# Patient Record
Sex: MALE | Race: WHITE
[De-identification: names, ages, dates, MRNs, and addresses within clinical notes are randomized per-mention and may not be internally consistent; named-entity substitution may affect disease eponyms.]

---

## 2018-07-22 ENCOUNTER — HOSPITAL ENCOUNTER (OUTPATIENT)
Dept: HOSPITAL 13 - RAD | Age: 58
Discharge: HOME | End: 2018-07-22
Payer: COMMERCIAL

## 2018-07-22 DIAGNOSIS — K57.90: Primary | ICD-10-CM

## 2018-07-22 DIAGNOSIS — N28.1: ICD-10-CM

## 2018-07-22 DIAGNOSIS — K76.0: ICD-10-CM

## 2023-01-23 ENCOUNTER — HOSPITAL ENCOUNTER (OUTPATIENT)
Dept: NON INVASIVE DIAGNOSTICS | Age: 63
Discharge: HOME | End: 2023-01-23
Payer: COMMERCIAL

## 2023-01-23 ENCOUNTER — TRANSCRIBE ORDER (OUTPATIENT)
Dept: REGISTRATION | Age: 63
End: 2023-01-23

## 2023-01-23 ENCOUNTER — HOSPITAL ENCOUNTER (OUTPATIENT)
Dept: LAB | Age: 63
Discharge: HOME | End: 2023-01-23

## 2023-01-23 DIAGNOSIS — D36.16 BENIGN NEOPLASM OF PERIPHERAL NERVE OF PELVIS REGION: ICD-10-CM

## 2023-01-23 DIAGNOSIS — K40.30 INGUINAL HERNIA WITH OBSTRUCTION BUT NO GANGRENE: ICD-10-CM

## 2023-01-23 DIAGNOSIS — K40.30 INGUINAL HERNIA WITH OBSTRUCTION BUT NO GANGRENE: Primary | ICD-10-CM

## 2023-01-23 LAB
ATRIAL RATE: 62 BPM
CALCULATED P AXIS, ECG09: 74 DEGREES
CALCULATED R AXIS, ECG10: 55 DEGREES
CALCULATED T AXIS, ECG11: 49 DEGREES
DIAGNOSIS, 93000: NORMAL
P-R INTERVAL, ECG05: 166 MS
Q-T INTERVAL, ECG07: 398 MS
QRS DURATION, ECG06: 86 MS
QTC CALCULATION (BEZET), ECG08: 403 MS
SENTARA SPECIMEN COL,SENBCF: NORMAL
VENTRICULAR RATE, ECG03: 62 BPM

## 2023-01-23 PROCEDURE — 93005 ELECTROCARDIOGRAM TRACING: CPT

## 2023-01-23 PROCEDURE — 99001 SPECIMEN HANDLING PT-LAB: CPT

## 2023-02-10 ENCOUNTER — HOSPITAL ENCOUNTER (OUTPATIENT)
Age: 63
Setting detail: OUTPATIENT SURGERY
Discharge: HOME | End: 2023-02-10
Attending: SURGERY | Admitting: SURGERY
Payer: COMMERCIAL

## 2023-02-10 ENCOUNTER — ANESTHESIA EVENT (OUTPATIENT)
Dept: ENDOSCOPY | Age: 63
End: 2023-02-10
Payer: COMMERCIAL

## 2023-02-10 ENCOUNTER — ANESTHESIA (OUTPATIENT)
Dept: ENDOSCOPY | Age: 63
End: 2023-02-10
Payer: COMMERCIAL

## 2023-02-10 VITALS
BODY MASS INDEX: 24.65 KG/M2 | TEMPERATURE: 97.6 F | RESPIRATION RATE: 16 BRPM | SYSTOLIC BLOOD PRESSURE: 110 MMHG | HEIGHT: 70 IN | HEART RATE: 67 BPM | OXYGEN SATURATION: 100 % | DIASTOLIC BLOOD PRESSURE: 79 MMHG | WEIGHT: 172.2 LBS

## 2023-02-10 PROCEDURE — 74011250636 HC RX REV CODE- 250/636: Performed by: SURGERY

## 2023-02-10 PROCEDURE — 77030040361 HC SLV COMPR DVT MDII -B: Performed by: SURGERY

## 2023-02-10 PROCEDURE — 76040000019: Performed by: SURGERY

## 2023-02-10 PROCEDURE — 76060000031 HC ANESTHESIA FIRST 0.5 HR: Performed by: SURGERY

## 2023-02-10 PROCEDURE — 2709999900 HC NON-CHARGEABLE SUPPLY: Performed by: SURGERY

## 2023-02-10 PROCEDURE — 74011000250 HC RX REV CODE- 250: Performed by: INTERNAL MEDICINE

## 2023-02-10 PROCEDURE — 74011250636 HC RX REV CODE- 250/636: Performed by: INTERNAL MEDICINE

## 2023-02-10 RX ORDER — SELENIUM 100 MCG
1 TABLET ORAL DAILY
COMMUNITY

## 2023-02-10 RX ORDER — SODIUM CHLORIDE 9 MG/ML
125 INJECTION, SOLUTION INTRAVENOUS CONTINUOUS
Status: DISCONTINUED | OUTPATIENT
Start: 2023-02-10 | End: 2023-02-10 | Stop reason: HOSPADM

## 2023-02-10 RX ORDER — LIDOCAINE HYDROCHLORIDE 20 MG/ML
INJECTION, SOLUTION EPIDURAL; INFILTRATION; INTRACAUDAL; PERINEURAL AS NEEDED
Status: DISCONTINUED | OUTPATIENT
Start: 2023-02-10 | End: 2023-02-10 | Stop reason: HOSPADM

## 2023-02-10 RX ORDER — PROPOFOL 10 MG/ML
INJECTION, EMULSION INTRAVENOUS AS NEEDED
Status: DISCONTINUED | OUTPATIENT
Start: 2023-02-10 | End: 2023-02-10 | Stop reason: HOSPADM

## 2023-02-10 RX ORDER — PANTOPRAZOLE SODIUM 20 MG/1
20 TABLET, DELAYED RELEASE ORAL DAILY
COMMUNITY
Start: 2023-02-01

## 2023-02-10 RX ORDER — CHOLECALCIFEROL (VITAMIN D3) 50 MCG
1 CAPSULE ORAL DAILY
COMMUNITY

## 2023-02-10 RX ORDER — CALCIUM POLYCARBOPHIL 625 MG
625 TABLET ORAL DAILY
COMMUNITY

## 2023-02-10 RX ORDER — MULTIVIT WITH MINERALS/HERBS
1 TABLET ORAL DAILY
COMMUNITY

## 2023-02-10 RX ORDER — MULTIVITAMIN WITH IRON
TABLET ORAL
COMMUNITY

## 2023-02-10 RX ORDER — GLUCOSAMINE SULFATE 1500 MG
1000 POWDER IN PACKET (EA) ORAL DAILY
COMMUNITY

## 2023-02-10 RX ADMIN — LIDOCAINE HYDROCHLORIDE 80 MG: 20 INJECTION, SOLUTION EPIDURAL; INFILTRATION; INTRACAUDAL; PERINEURAL at 08:55

## 2023-02-10 RX ADMIN — SODIUM CHLORIDE 125 ML/HR: 9 INJECTION, SOLUTION INTRAVENOUS at 08:07

## 2023-02-10 RX ADMIN — PROPOFOL 50 MG: 10 INJECTION, EMULSION INTRAVENOUS at 08:55

## 2023-02-10 RX ADMIN — PROPOFOL 75 MCG/KG/MIN: 10 INJECTION, EMULSION INTRAVENOUS at 08:56

## 2023-02-10 NOTE — OP NOTES
Colonoscopy Procedure Note    Indications: Routine screening    Anesthesia/Sedation: MAC anesthesia Propofol    Pre-Procedure Exam:  Airway: clear   Heart: normal S1and S2    Lungs: clear bilateral  Abdomen: soft, nontender, bowel sounds present and normal in all quadrants   Mental Status: awake, alert, and oriented to person, place, and time      Procedure in Detail:  Informed consent was obtained for the procedure, including sedation. Risks of perforation, hemorrhage, adverse drug reaction, and aspiration were discussed. The patient was placed in the left lateral decubitus position. Based on the pre-procedure assessment, including review of the patient's medical history, medications, allergies, and review of systems, he had been deemed to be an appropriate candidate for moderate sedation; he was therefore sedated with the medications listed above. The patient was monitored continuously with ECG tracing, pulse oximetry, blood pressure monitoring, and direct observations. A rectal examination was performed. The [id number] was inserted into the rectum and advanced under direct vision to the cecum, which was identified by the ileocecal valve and appendiceal orifice. The quality of the colonic preparation was satisfactory. A careful inspection was made as the colonoscope was withdrawn, including a retroflexed view of the rectum; findings and interventions are described below. Appropriate photodocumentation was obtained. Findings:   Rectum:   Normal  Sigmoid:     - Excavated lesions:     - Diverticulosis  Descending Colon:   Normal  Transverse Colon:   Normal  Ascending Colon:   Normal  Cecum:   Normal  Terminal Ileum:   Not entered    Specimens: No specimens were collected. EBL: None    Complications: None; patient tolerated the procedure well. Attending Attestation: I performed the procedure.     Assistant:  None    Implants:  * No implants in log *    Recommendations:   - For colon cancer screening in this average-risk patient, colonoscopy may be repeated in 10 years.     Signed By: Bharathi Gaona MD                       2/10/2023

## 2023-02-10 NOTE — DISCHARGE INSTRUCTIONS
Santy Port  [id number]  1960    COLON DISCHARGE INSTRUCTIONS    Discomfort:  Redness at IV site- apply warm compress to area; if redness or soreness persist- contact your physician  There may be a slight amount of blood passed from the rectum  Gaseous discomfort- walking, belching will help relieve any discomfort  You should not operate a vehicle for 12 hours  You should not engage in an occupation involving machinery or appliances for rest of today  You may not drink alcoholic beverages for at least 12 hours  Avoid making any critical decisions for at least 24 hour  DIET:   High fiber diet. - however -  remember your colon is empty and a heavy meal will produce gas. Avoid these foods:  vegetables, fried / greasy foods, carbonated drinks for today     ACTIVITY:  You may resume your normal daily activities it is recommended that you spend the remainder of the day resting -  avoid any strenuous activity. CALL M.D. ANY SIGN OF:   Increasing pain, nausea, vomiting  Abdominal distension (swelling)  New increased bleeding (oral or rectal)  Fever (chills)  Pain in chest area  Bloody discharge from nose or mouth  Shortness of breath      Follow-up Instructions:   Dr Ford Ashby will call you in one to two weeks. If you do not hear from him, please call his office [phone].                           Santy Port  [id number]  1960        DISCHARGE SUMMARY from Nurse    The following personal items collected during your admission are returned to you:   Dental Appliance: Dental Appliances: None  Vision: Visual Aid: Glasses, At bedside, Other (comment) (reading glasses with wife)  Hearing Aid:    Jewelry:    Clothing:    Other Valuables:    Valuables sent to safe:      DISCHARGE SUMMARY from Nurse    PATIENT INSTRUCTIONS:    After general anesthesia or intravenous sedation, for 24 hours or while taking prescription Narcotics:  Limit your activities  Do not drive and operate hazardous machinery  Do not make important personal or business decisions  Do  not drink alcoholic beverages  If you have not urinated within 8 hours after discharge, please contact your surgeon on call. Report the following to your surgeon:  Excessive pain, swelling, redness or odor of or around the surgical area  Temperature over 100.5  Nausea and vomiting lasting longer than 4 hours or if unable to take medications  Any signs of decreased circulation or nerve impairment to extremity: change in color, persistent  numbness, tingling, coldness or increase pain  Any questions    What to do at Home:  Recommended activity: Activity as tolerated and no driving for today. If you experience any of the following symptoms as above, please follow up with Dr. Jayashree Lam. *  Please give a list of your current medications to your Primary Care Provider. *  Please update this list whenever your medications are discontinued, doses are      changed, or new medications (including over-the-counter products) are added. *  Please carry medication information at all times in case of emergency situations. These are general instructions for a healthy lifestyle:    No smoking/ No tobacco products/ Avoid exposure to second hand smoke  Surgeon General's Warning:  Quitting smoking now greatly reduces serious risk to your health. Obesity, smoking, and sedentary lifestyle greatly increases your risk for illness    A healthy diet, regular physical exercise & weight monitoring are important for maintaining a healthy lifestyle    You may be retaining fluid if you have a history of heart failure or if you experience any of the following symptoms:  Weight gain of 3 pounds or more overnight or 5 pounds in a week, increased swelling in our hands or feet or shortness of breath while lying flat in bed. Please call your doctor as soon as you notice any of these symptoms; do not wait until your next office visit.     The discharge information has been reviewed with the patient and spouse. The patient and spouse verbalized understanding. Discharge medications reviewed with the patient and spouse and appropriate educational materials and side effects teaching were provided. ___________________________________________________________________________________________________________________________________  Patient armband removed and shredded.

## 2023-02-10 NOTE — BRIEF OP NOTE
Brief Postoperative Note    Patient: Vu Ospina  YOB: 1960  MRN: [id number]    Date of Procedure: 2/10/2023     Pre-Op Diagnosis: SCREENING    Post-Op Diagnosis: Same as preoperative diagnosis. Procedure(s):  COLONOSCOPY WITH MAC    Surgeon(s):   Chiquis Sharma MD    Surgical Assistant: None    Anesthesia: MAC     Estimated Blood Loss (mL): Minimal    Complications: None    Specimens: * No specimens in log *     Implants: * No implants in log *    Drains: * No LDAs found *    Findings: tics    Electronically Signed by Chris Javed MD on 2/10/2023 at 9:10 AM

## 2023-02-10 NOTE — ANESTHESIA POSTPROCEDURE EVALUATION
Procedure(s):  COLONOSCOPY WITH MAC.     MAC    Anesthesia Post Evaluation      Multimodal analgesia: multimodal analgesia not used between 6 hours prior to anesthesia start to PACU discharge  Patient location during evaluation: PACU  Patient participation: complete - patient participated  Level of consciousness: awake and alert  Pain score: 0  Pain management: adequate  Airway patency: patent  Anesthetic complications: no  Cardiovascular status: blood pressure returned to baseline and hemodynamically stable  Respiratory status: room air, unassisted, spontaneous ventilation and nonlabored ventilation  Hydration status: balanced  Post anesthesia nausea and vomiting:  none  Final Post Anesthesia Temperature Assessment:  Normothermia (36.0-37.5 degrees C)      INITIAL Post-op Vital signs:   Vitals Value Taken Time   /69 02/10/23 0915   Temp 36.4 °C (97.6 °F) 02/10/23 0915   Pulse 65 02/10/23 0915   Resp 16 02/10/23 0915   SpO2 96 % 02/10/23 0915

## 2023-02-10 NOTE — H&P
History & Physical    Patient: Guadalupe Vela MRN: [id number]  CSN: [id number]    YOB: 1960  Age: 60 y.o. Sex: male      DOA: 2/10/2023       HPI:     Guadalupe Vela is a 60 y.o. male who presents for cscope    Past Medical History:   Diagnosis Date    GERD (gastroesophageal reflux disease)     Psychiatric disorder     anxiety       Past Surgical History:   Procedure Laterality Date    HX HEENT      tonsillectomy    HX HEENT      septoplasty    HX HERNIA REPAIR Right     inguinal    HX ORTHOPAEDIC      left elbow - ulner nerve relocation       History reviewed. No pertinent family history. Social History     Socioeconomic History    Marital status:    Tobacco Use    Smoking status: Never     Passive exposure: Never    Smokeless tobacco: Never   Substance and Sexual Activity    Alcohol use: Yes     Comment: rarely    Drug use: No       Prior to Admission medications    Medication Sig Start Date End Date Taking? Authorizing Provider   pantoprazole (PROTONIX) 20 mg tablet Take 20 mg by mouth daily. 2/1/23  Yes Provider, Historical   vit A and D3 in cod liver oiL (cod liver oiL) 1,250-135 unit cap Take  by mouth. Yes Provider, Historical   selenium 100 mcg tab Take 1 Caplet by mouth daily. Yes Provider, Historical   calcium polycarbophiL (FIBERCON) 625 mg tablet Take 625 mg by mouth daily. Yes Provider, Historical   B.animalis,bifid,infantis,long (Probiotic 4X) 10-15 mg TbEC Take 1 Tablet by mouth daily. Yes Provider, Historical   cholecalciferol (Vitamin D3) 25 mcg (1,000 unit) cap Take 1,000 Units by mouth daily. Yes Provider, Historical   b complex vitamins tablet Take 1 Tablet by mouth daily. Yes Provider, Historical       No Known Allergies    Review of Systems  A comprehensive review of systems was negative except for that written in the History of Present Illness.       Physical Exam:      Visit Vitals  /77   Pulse 72   Temp 98.3 °F (36.8 °C)   Resp 16   Ht 5' 10\" (1.778 m)   Wt 78.1 kg (172 lb 3.2 oz)   SpO2 98%   BMI 24.71 kg/m²       Physical Exam:  Physical Exam:   General:  Alert, cooperative, no distress, appears stated age. Eyes:  Conjunctivae/corneas clear. PERRL, EOMs intact. Fundi benign   Ears:  Normal TMs and external ear canals both ears. Nose: Nares normal. Septum midline. Mucosa normal. No drainage or sinus tenderness. Mouth/Throat: Lips, mucosa, and tongue normal. Teeth and gums normal.   Neck: Supple, symmetrical, trachea midline, no adenopathy, thyroid: no enlargement/tenderness/nodules, no carotid bruit and no JVD. Back:   Symmetric, no curvature. ROM normal. No CVA tenderness. Lungs:   Clear to auscultation bilaterally. Heart:  Regular rate and rhythm, S1, S2 normal, no murmur, click, rub or gallop. Abdomen:   Soft, non-tender. Bowel sounds normal. No masses,  No organomegaly. Extremities: Extremities normal, atraumatic, no cyanosis or edema. Pulses: 2+ and symmetric all extremities. Skin: Skin color, texture, turgor normal. No rashes or lesions   Lymph nodes: Cervical, supraclavicular, and axillary nodes normal.   Neurologic: CNII-XII intact. Normal strength, sensation and reflexes throughout. Labs Reviewed: All lab results for the last 24 hours reviewed. Assessment/Plan     Active Problems:    * No active hospital problems. *      Cscope, discussed risks.

## 2023-02-10 NOTE — ANESTHESIA PREPROCEDURE EVALUATION
Relevant Problems   No relevant active problems       Anesthetic History   No history of anesthetic complications            Review of Systems / Medical History  Patient summary reviewed, nursing notes reviewed and pertinent labs reviewed    Pulmonary  Within defined limits                 Neuro/Psych         Psychiatric history     Cardiovascular                  Exercise tolerance: >4 METS     GI/Hepatic/Renal     GERD: well controlled           Endo/Other  Within defined limits           Other Findings              Physical Exam    Airway  Mallampati: I  TM Distance: > 6 cm  Neck ROM: normal range of motion   Mouth opening: Normal     Cardiovascular    Rhythm: regular  Rate: normal         Dental  No notable dental hx       Pulmonary  Breath sounds clear to auscultation               Abdominal  GI exam deferred       Other Findings            Anesthetic Plan    ASA: 1  Anesthesia type: MAC    Monitoring Plan: Continuous noninvasive hemodynamic monitoring        Anesthetic plan and risks discussed with: Patient

## 2023-02-22 ENCOUNTER — ANESTHESIA EVENT (OUTPATIENT)
Facility: HOSPITAL | Age: 63
End: 2023-02-22
Payer: COMMERCIAL

## 2023-02-22 ENCOUNTER — ANESTHESIA (OUTPATIENT)
Facility: HOSPITAL | Age: 63
End: 2023-02-22
Payer: COMMERCIAL

## 2023-02-22 ENCOUNTER — HOSPITAL ENCOUNTER (OUTPATIENT)
Facility: HOSPITAL | Age: 63
Setting detail: OUTPATIENT SURGERY
Discharge: HOME | End: 2023-02-22
Attending: SURGERY | Admitting: SURGERY
Payer: COMMERCIAL

## 2023-02-22 VITALS
DIASTOLIC BLOOD PRESSURE: 65 MMHG | TEMPERATURE: 97.8 F | RESPIRATION RATE: 14 BRPM | SYSTOLIC BLOOD PRESSURE: 122 MMHG | BODY MASS INDEX: 25.07 KG/M2 | WEIGHT: 175.1 LBS | OXYGEN SATURATION: 97 % | HEART RATE: 72 BPM | HEIGHT: 70 IN

## 2023-02-22 DIAGNOSIS — K43.9 VENTRAL HERNIA WITHOUT OBSTRUCTION OR GANGRENE: Primary | ICD-10-CM

## 2023-02-22 PROCEDURE — 2500000003 HC RX 250 WO HCPCS: Performed by: NURSE ANESTHETIST, CERTIFIED REGISTERED

## 2023-02-22 PROCEDURE — 3700000000 HC ANESTHESIA ATTENDED CARE: Performed by: SURGERY

## 2023-02-22 PROCEDURE — 2709999900 HC NON-CHARGEABLE SUPPLY: Performed by: SURGERY

## 2023-02-22 PROCEDURE — 2500000003 HC RX 250 WO HCPCS: Performed by: SURGERY

## 2023-02-22 PROCEDURE — C1781 MESH (IMPLANTABLE): HCPCS | Performed by: SURGERY

## 2023-02-22 PROCEDURE — S2900 ROBOTIC SURGICAL SYSTEM: HCPCS | Performed by: SURGERY

## 2023-02-22 PROCEDURE — 7100000011 HC PHASE II RECOVERY - ADDTL 15 MIN: Performed by: SURGERY

## 2023-02-22 PROCEDURE — 6360000002 HC RX W HCPCS: Performed by: NURSE ANESTHETIST, CERTIFIED REGISTERED

## 2023-02-22 PROCEDURE — 6370000000 HC RX 637 (ALT 250 FOR IP): Performed by: ANESTHESIOLOGY

## 2023-02-22 PROCEDURE — 6360000002 HC RX W HCPCS: Performed by: SURGERY

## 2023-02-22 PROCEDURE — 3700000001 HC ADD 15 MINUTES (ANESTHESIA): Performed by: SURGERY

## 2023-02-22 PROCEDURE — 7100000001 HC PACU RECOVERY - ADDTL 15 MIN: Performed by: SURGERY

## 2023-02-22 PROCEDURE — 7100000010 HC PHASE II RECOVERY - FIRST 15 MIN: Performed by: SURGERY

## 2023-02-22 PROCEDURE — 2720000010 HC SURG SUPPLY STERILE: Performed by: SURGERY

## 2023-02-22 PROCEDURE — 7100000000 HC PACU RECOVERY - FIRST 15 MIN: Performed by: SURGERY

## 2023-02-22 PROCEDURE — 6360000002 HC RX W HCPCS: Performed by: ANESTHESIOLOGY

## 2023-02-22 PROCEDURE — 3600000019 HC SURGERY ROBOT ADDTL 15MIN: Performed by: SURGERY

## 2023-02-22 PROCEDURE — 2580000003 HC RX 258: Performed by: SURGERY

## 2023-02-22 PROCEDURE — 3600000009 HC SURGERY ROBOT BASE: Performed by: SURGERY

## 2023-02-22 DEVICE — COMPOSITE MESH MONOFILAMENT POLYPROPYLENE MESH WITH ABSORBABLE SYNTHETIC FILM AND MARKING
Type: IMPLANTABLE DEVICE | Site: ABDOMEN | Status: FUNCTIONAL
Brand: PARIETENE DS

## 2023-02-22 RX ORDER — ONDANSETRON 2 MG/ML
INJECTION INTRAMUSCULAR; INTRAVENOUS PRN
Status: DISCONTINUED | OUTPATIENT
Start: 2023-02-22 | End: 2023-02-22 | Stop reason: SDUPTHER

## 2023-02-22 RX ORDER — MIDAZOLAM HYDROCHLORIDE 1 MG/ML
INJECTION INTRAMUSCULAR; INTRAVENOUS PRN
Status: DISCONTINUED | OUTPATIENT
Start: 2023-02-22 | End: 2023-02-22 | Stop reason: SDUPTHER

## 2023-02-22 RX ORDER — SODIUM CHLORIDE 9 MG/ML
INJECTION, SOLUTION INTRAVENOUS PRN
Status: DISCONTINUED | OUTPATIENT
Start: 2023-02-22 | End: 2023-02-22 | Stop reason: HOSPADM

## 2023-02-22 RX ORDER — DEXAMETHASONE SODIUM PHOSPHATE 4 MG/ML
INJECTION, SOLUTION INTRA-ARTICULAR; INTRALESIONAL; INTRAMUSCULAR; INTRAVENOUS; SOFT TISSUE PRN
Status: DISCONTINUED | OUTPATIENT
Start: 2023-02-22 | End: 2023-02-22 | Stop reason: SDUPTHER

## 2023-02-22 RX ORDER — FENTANYL CITRATE 50 UG/ML
25 INJECTION, SOLUTION INTRAMUSCULAR; INTRAVENOUS EVERY 5 MIN PRN
Status: DISCONTINUED | OUTPATIENT
Start: 2023-02-22 | End: 2023-02-22 | Stop reason: HOSPADM

## 2023-02-22 RX ORDER — OXYCODONE HYDROCHLORIDE 5 MG/1
5 TABLET ORAL
Status: COMPLETED | OUTPATIENT
Start: 2023-02-22 | End: 2023-02-22

## 2023-02-22 RX ORDER — SODIUM CHLORIDE 0.9 % (FLUSH) 0.9 %
5-40 SYRINGE (ML) INJECTION PRN
Status: DISCONTINUED | OUTPATIENT
Start: 2023-02-22 | End: 2023-02-22 | Stop reason: HOSPADM

## 2023-02-22 RX ORDER — SODIUM CHLORIDE, SODIUM LACTATE, POTASSIUM CHLORIDE, CALCIUM CHLORIDE 600; 310; 30; 20 MG/100ML; MG/100ML; MG/100ML; MG/100ML
INJECTION, SOLUTION INTRAVENOUS CONTINUOUS
Status: DISCONTINUED | OUTPATIENT
Start: 2023-02-22 | End: 2023-02-22 | Stop reason: HOSPADM

## 2023-02-22 RX ORDER — HYDROMORPHONE HYDROCHLORIDE 1 MG/ML
0.5 INJECTION, SOLUTION INTRAMUSCULAR; INTRAVENOUS; SUBCUTANEOUS EVERY 5 MIN PRN
Status: DISCONTINUED | OUTPATIENT
Start: 2023-02-22 | End: 2023-02-22 | Stop reason: HOSPADM

## 2023-02-22 RX ORDER — EPHEDRINE SULFATE/0.9% NACL/PF 50 MG/5 ML
SYRINGE (ML) INTRAVENOUS PRN
Status: DISCONTINUED | OUTPATIENT
Start: 2023-02-22 | End: 2023-02-22 | Stop reason: SDUPTHER

## 2023-02-22 RX ORDER — LIDOCAINE HYDROCHLORIDE 20 MG/ML
INJECTION, SOLUTION EPIDURAL; INFILTRATION; INTRACAUDAL; PERINEURAL PRN
Status: DISCONTINUED | OUTPATIENT
Start: 2023-02-22 | End: 2023-02-22 | Stop reason: SDUPTHER

## 2023-02-22 RX ORDER — BUPIVACAINE HYDROCHLORIDE 2.5 MG/ML
INJECTION, SOLUTION EPIDURAL; INFILTRATION; INTRACAUDAL PRN
Status: DISCONTINUED | OUTPATIENT
Start: 2023-02-22 | End: 2023-02-22 | Stop reason: ALTCHOICE

## 2023-02-22 RX ORDER — OXYCODONE HYDROCHLORIDE AND ACETAMINOPHEN 5; 325 MG/1; MG/1
1 TABLET ORAL EVERY 6 HOURS PRN
Qty: 20 TABLET | Refills: 0 | Status: SHIPPED | OUTPATIENT
Start: 2023-02-22 | End: 2023-02-27

## 2023-02-22 RX ORDER — FENTANYL CITRATE 50 UG/ML
INJECTION, SOLUTION INTRAMUSCULAR; INTRAVENOUS PRN
Status: DISCONTINUED | OUTPATIENT
Start: 2023-02-22 | End: 2023-02-22 | Stop reason: SDUPTHER

## 2023-02-22 RX ORDER — PROPOFOL 10 MG/ML
INJECTION, EMULSION INTRAVENOUS PRN
Status: DISCONTINUED | OUTPATIENT
Start: 2023-02-22 | End: 2023-02-22 | Stop reason: SDUPTHER

## 2023-02-22 RX ORDER — ROCURONIUM BROMIDE 10 MG/ML
INJECTION, SOLUTION INTRAVENOUS PRN
Status: DISCONTINUED | OUTPATIENT
Start: 2023-02-22 | End: 2023-02-22 | Stop reason: SDUPTHER

## 2023-02-22 RX ORDER — SODIUM CHLORIDE 0.9 % (FLUSH) 0.9 %
5-40 SYRINGE (ML) INJECTION EVERY 12 HOURS SCHEDULED
Status: DISCONTINUED | OUTPATIENT
Start: 2023-02-22 | End: 2023-02-22 | Stop reason: HOSPADM

## 2023-02-22 RX ADMIN — FENTANYL CITRATE 50 MCG: 50 INJECTION, SOLUTION INTRAMUSCULAR; INTRAVENOUS at 14:19

## 2023-02-22 RX ADMIN — LIDOCAINE HYDROCHLORIDE 80 MG: 20 INJECTION, SOLUTION EPIDURAL; INFILTRATION; INTRACAUDAL; PERINEURAL at 14:19

## 2023-02-22 RX ADMIN — HYDROMORPHONE HYDROCHLORIDE 0.5 MG: 1 INJECTION, SOLUTION INTRAMUSCULAR; INTRAVENOUS; SUBCUTANEOUS at 15:17

## 2023-02-22 RX ADMIN — HYDROMORPHONE HYDROCHLORIDE 0.5 MG: 1 INJECTION, SOLUTION INTRAMUSCULAR; INTRAVENOUS; SUBCUTANEOUS at 15:29

## 2023-02-22 RX ADMIN — SODIUM CHLORIDE, SODIUM LACTATE, POTASSIUM CHLORIDE, AND CALCIUM CHLORIDE: 600; 310; 30; 20 INJECTION, SOLUTION INTRAVENOUS at 11:50

## 2023-02-22 RX ADMIN — ONDANSETRON HYDROCHLORIDE 4 MG: 2 INJECTION INTRAMUSCULAR; INTRAVENOUS at 14:43

## 2023-02-22 RX ADMIN — DEXAMETHASONE SODIUM PHOSPHATE 8 MG: 4 INJECTION, SOLUTION INTRAMUSCULAR; INTRAVENOUS at 14:39

## 2023-02-22 RX ADMIN — Medication 2 G: at 14:29

## 2023-02-22 RX ADMIN — HYDROMORPHONE HYDROCHLORIDE 0.5 MG: 1 INJECTION, SOLUTION INTRAMUSCULAR; INTRAVENOUS; SUBCUTANEOUS at 15:22

## 2023-02-22 RX ADMIN — Medication 20 MG: at 14:33

## 2023-02-22 RX ADMIN — PROPOFOL 150 MG: 10 INJECTION, EMULSION INTRAVENOUS at 14:19

## 2023-02-22 RX ADMIN — OXYCODONE 5 MG: 5 TABLET ORAL at 15:58

## 2023-02-22 RX ADMIN — SODIUM CHLORIDE, SODIUM LACTATE, POTASSIUM CHLORIDE, AND CALCIUM CHLORIDE: 600; 310; 30; 20 INJECTION, SOLUTION INTRAVENOUS at 14:34

## 2023-02-22 RX ADMIN — SUGAMMADEX 200 MG: 100 INJECTION, SOLUTION INTRAVENOUS at 15:04

## 2023-02-22 RX ADMIN — ROCURONIUM BROMIDE 40 MG: 10 INJECTION, SOLUTION INTRAVENOUS at 14:21

## 2023-02-22 RX ADMIN — MIDAZOLAM 2 MG: 1 INJECTION INTRAMUSCULAR; INTRAVENOUS at 14:19

## 2023-02-22 RX ADMIN — FENTANYL CITRATE 50 MCG: 50 INJECTION, SOLUTION INTRAMUSCULAR; INTRAVENOUS at 14:32

## 2023-02-22 ASSESSMENT — PAIN SCALES - GENERAL
PAINLEVEL_OUTOF10: 7
PAINLEVEL_OUTOF10: 6
PAINLEVEL_OUTOF10: 9
PAINLEVEL_OUTOF10: 7
PAINLEVEL_OUTOF10: 6
PAINLEVEL_OUTOF10: 5
PAINLEVEL_OUTOF10: 5
PAINLEVEL_OUTOF10: 0
PAINLEVEL_OUTOF10: 0

## 2023-02-22 ASSESSMENT — PAIN DESCRIPTION - LOCATION
LOCATION: ABDOMEN

## 2023-02-22 ASSESSMENT — PAIN DESCRIPTION - ORIENTATION
ORIENTATION: MID;LOWER
ORIENTATION: MID;LOWER

## 2023-02-22 ASSESSMENT — PAIN - FUNCTIONAL ASSESSMENT: PAIN_FUNCTIONAL_ASSESSMENT: 0-10

## 2023-02-22 ASSESSMENT — PAIN DESCRIPTION - DESCRIPTORS
DESCRIPTORS: SORE
DESCRIPTORS: SORE
DESCRIPTORS: ACHING

## 2023-02-22 NOTE — PERIOP NOTE
TRANSFER - IN REPORT:    Verbal report received from OR RN on mad Blue  being received from OR for routine progression of patient care      Report consisted of patient's Situation, Background, Assessment and   Recommendations(SBAR). Information from the following report(s) Adult Overview, Surgery Report, Intake/Output, and MAR was reviewed with the receiving nurse. Opportunity for questions and clarification was provided. Assessment completed upon patient's arrival to unit and care assumed.

## 2023-02-22 NOTE — ANESTHESIA POSTPROCEDURE EVALUATION
Department of Anesthesiology  Postprocedure Note    Patient: Sintia Del Rio  MRN: [id number]  YOB: 1960  Date of evaluation: 2/22/2023      Procedure Summary     Date: 02/22/23 Room / Location: THE [facility] MAIN OR    Anesthesia Start: 1410 Anesthesia Stop: 2055    Procedure: ROBOTIC VENTRAL HERNIA (Abdomen) Diagnosis:       Ventral hernia without obstruction or gangrene      (VENTRAL HERNIA >4CM)    Surgeons: Jaelyn Mcarthur MD Responsible Provider: Bess Andrade MD    Anesthesia Type: general ASA Status: 2          Anesthesia Type: No value filed.     Hank Phase I: Hank Score: 10    Hank Phase II:        Anesthesia Post Evaluation    Patient location during evaluation: PACU  Patient participation: complete - patient participated  Level of consciousness: awake  Pain score: 0  Airway patency: patent  Nausea & Vomiting: no nausea and no vomiting  Complications: no  Cardiovascular status: blood pressure returned to baseline  Respiratory status: acceptable  Hydration status: stable  Multimodal analgesia pain management approach

## 2023-02-22 NOTE — ANESTHESIA PRE PROCEDURE
Department of Anesthesiology  Preprocedure Note       Name:  Alice Tapia   Age:  60 y.o.  :  1960                                          MRN:  [id number]         Date:  2023      Surgeon: Michell Marr):  Binu Watson MD    Procedure: Procedure(s):  ROBOTIC VENTRAL HERNIA    Medications prior to admission:   Prior to Admission medications    Medication Sig Start Date End Date Taking? Authorizing Provider   pantoprazole (PROTONIX) 20 MG tablet Take 20 mg by mouth Daily with lunch 23   Historical Provider, MD   Calcium Polycarbophil (FIBER) 625 MG TABS Take 625 mg by mouth daily    Historical Provider, MD   Vitamins A & D (VITAMIN A & D) 5000-400 units CAPS Take by mouth    Historical Provider, MD   B Complex Vitamins (B COMPLEX 1 PO) Take 1 tablet by mouth daily    Historical Provider, MD   Probiotic Product (PROBIOTIC-10 PO) Take 1 tablet by mouth daily    Historical Provider, MD       Current medications:    Current Facility-Administered Medications   Medication Dose Route Frequency Provider Last Rate Last Admin    lactated ringers IV soln infusion   IntraVENous Continuous Binu Watson  mL/hr at 23 1150 New Bag at 23 1150    ceFAZolin (ANCEF) 2000 mg in sterile water 20 mL IV syringe  2,000 mg IntraVENous On Call to 200 Arin Rasmussen MD           Allergies:  No Known Allergies    Problem List:  There is no problem list on file for this patient.       Past Medical History:        Diagnosis Date    CAD (coronary artery disease)     elevated triglycerides    Cancer (HCC)     skin    GERD (gastroesophageal reflux disease)     History of diverticulosis     No advance directives     Psychiatric disorder     anxiety    PUD (peptic ulcer disease)     patient denies    Wears glasses     for reading prn       Past Surgical History:        Procedure Laterality Date    HEENT      septoplasty    HEENT      tonsillectomy x2    ORTHOPEDIC SURGERY  1985    left elbow - ulnar nerve transposition    ID UNLISTED PROCEDURE ABDOMEN PERITONEUM & OMENTUM Right 1973    inguinal hernia repair       Social History:    Social History     Tobacco Use    Smoking status: Never    Smokeless tobacco: Not on file   Substance Use Topics    Alcohol use: Yes     Alcohol/week: 4.0 standard drinks     Types: 2 Glasses of wine, 2 Cans of beer per week                                Counseling given: Not Answered      Vital Signs (Current):   Vitals:    02/22/23 1119 02/22/23 1148   BP: 128/77    Pulse: 65 65   Resp: 16    Temp: 97.5 °F (36.4 °C)    TempSrc: Temporal    SpO2: 96%    Weight: 175 lb 1.6 oz (79.4 kg)    Height: 5' 10\" (1.778 m)                                               BP Readings from Last 3 Encounters:   02/22/23 128/77       NPO Status: Time of last liquid consumption: 2130                        Time of last solid consumption: 2000                        Date of last liquid consumption: 02/21/23                        Date of last solid food consumption: 02/21/23    BMI:   Wt Readings from Last 3 Encounters:   02/22/23 175 lb 1.6 oz (79.4 kg)   02/20/23 172 lb (78 kg)     Body mass index is 25.12 kg/m². CBC: No results found for: WBC, RBC, HGB, HCT, MCV, RDW, PLT    CMP: No results found for: NA, K, CL, CO2, BUN, CREATININE, GFRAA, AGRATIO, LABGLOM, GLUCOSE, GLU, PROT, CALCIUM, BILITOT, ALKPHOS, AST, ALT    POC Tests: No results for input(s): POCGLU, POCNA, POCK, POCCL, POCBUN, POCHEMO, POCHCT in the last 72 hours.     Coags: No results found for: PROTIME, INR, APTT    HCG (If Applicable): No results found for: PREGTESTUR, PREGSERUM, HCG, HCGQUANT     ABGs: No results found for: PHART, PO2ART, VYK2NRM, PJR7OFD, BEART, J1TJLVMA     Type & Screen (If Applicable):  No results found for: LABABO, LABRH    Drug/Infectious Status (If Applicable):  No results found for: HIV, HEPCAB    COVID-19 Screening (If Applicable): No results found for: COVID19        Anesthesia Evaluation  Patient summary reviewed and Nursing notes reviewed  Airway: Mallampati: II  TM distance: >3 FB   Neck ROM: full  Mouth opening: > = 3 FB   Dental: normal exam         Pulmonary:Negative Pulmonary ROS                              Cardiovascular:  Exercise tolerance: good (>4 METS),           Rhythm: regular  Rate: normal                    Neuro/Psych:   Negative Neuro/Psych ROS  (+) psychiatric history:            GI/Hepatic/Renal:   (+) GERD: well controlled, PUD,           Endo/Other: Negative Endo/Other ROS                    Abdominal:             Vascular: negative vascular ROS. Other Findings:           Anesthesia Plan      general     ASA 2       Induction: intravenous. MIPS: Prophylactic antiemetics administered. Anesthetic plan and risks discussed with patient. Plan discussed with attending.     Attending anesthesiologist reviewed and agrees with Garfield Nava MD   2/22/2023

## 2023-02-22 NOTE — BRIEF OP NOTE
Brief Postoperative Note      Patient: Valerio Pedro  YOB: 1960  MRN: [id number]    Date of Procedure: 2/22/2023    Pre-Op Diagnosis: VENTRAL HERNIA >4CM    Post-Op Diagnosis: Same       Procedure(s):  ROBOTIC VENTRAL HERNIA    Surgeon(s):  Remington West MD    Assistant:  Surgical Assistant: Jose C Corado    Anesthesia: General    Estimated Blood Loss (mL): Minimal    Complications: None    Specimens:   * No specimens in log *    Implants:  Implant Name Type Inv.  Item Serial No.  Lot No. LRB No. Used Action   MESH BRIAN RND 12 CM COMP PARIETENE DS - [id number]  MESH BRIAN RND 12 CM COMP PARIETENE DS  [facility] [id number] N/A 1 Implanted         Drains: * No LDAs found *    Findings: ventral hernia      Electronically signed by Remington West MD on 2/22/2023 at 2:57 PM

## 2023-02-22 NOTE — ANESTHESIA PROCEDURE NOTES
Peripheral Block    Patient location during procedure: pre-op  Reason for block: at surgeon's request  Staffing  Performed: anesthesiologist   Anesthesiologist: Maria R Key MD  Preanesthetic Checklist  Completed: patient identified, IV checked, site marked, risks and benefits discussed, surgical/procedural consents, equipment checked, pre-op evaluation, timeout performed, anesthesia consent given, oxygen available and monitors applied/VS acknowledged  Peripheral Block   Prep: ChloraPrep  Patient monitoring: cardiac monitor, continuous pulse ox, IV access, oxygen, responsive to questions and frequent blood pressure checks  Injection technique: single-shot  Local infiltration: lidocaine  Local infiltration: lidocaine    Needle   Needle type: insulated echogenic nerve stimulator needle   Needle gauge: 22 G  Assessment   Injection assessment: negative aspiration for heme and no paresthesia on injection  Slow fractionated injection: yes  Hemodynamics: stable  Outcomes: uncomplicated and patient tolerated procedure well    Additional Notes  Ultrasound was used to visualize nerves and local anesthetic spread.

## 2023-02-22 NOTE — PERIOP NOTE
Discharge instructions given to patient and caregiver. Written instructions given to patient and caregiver. Verbal understanding given by patient and caregiver. ID band removed before leaving.

## 2023-02-22 NOTE — DISCHARGE INSTRUCTIONS
Post-Operative Discharge Instructions  Estelle Caceres. Rufina Reed M.D.  [address] [place] [address], [facility] [address] [facility], [facility], [place] Ashley Russo  [phone]    Patient: Aviva Andres MRN: [id number]  [facility]: [id number]    YOB: 1960  Age: 60 y.o. Sex: male    DOA: 2/22/2023 LOS: [unfilled]  Discharge Date: [unfilled]     Acute Diagnoses:  Ventral hernia without obstruction or gangrene [K43.9]    Chronic Medical Diagnoses:  [unfilled]    Diet  Resume prior to surgery diet as tolerated. Activity  Do not drive a car or operate any hazardous machinery the day of surgery. Rest quietly today. No bending or heavy lifting. You may resume other prior to surgery activities as tolerated. You may remove the bandage and shower in 1 day. Drain / Wound Care  Follow all drain / wound care instructions exactly as explained by the Nurse at time of discharge. Apply an ice pack to the surgical site for 48 hours. Do not put any salves or ointments on the wound. Allow it to form a dry scab. Leave steri-strips / Dermabond alone. They should be allowed to fall off on their own in 7-14 days. Medications  It is important to take your medications exactly as they are prescribed. Keep your medication in the bottles provided by the pharmacist, and keep a list of the medication names, dosages, and times they should be taken in your wallet. Call 911 anytime you think you may need emergency care. For example, call if:  You passed out (lost consciousness). You have severe trouble breathing. You have sudden chest pain and shortness of breath. Notify your Surgeon for any of the following:  Fever, chills, nausea, vomiting, severe abdominal pain or bleeding. If you experience any redness or discharge or sign of infection. Persistent nausea lasting more than 24 hours. If you are unable to reach your Surgeon for any of the symptoms above, you should proceed directly to the nearest Emergency Department.     Post-Operative Appointment Information    Call Dr. Marimar Ayala office tomorrow morning at [phone] to schedule a post-operative office visit in one (1) week. If any questions or concerns arise, call your Surgeon at [phone]. DISCHARGE SUMMARY from Nurse    PATIENT INSTRUCTIONS:    After general anesthesia or intravenous sedation, for 24 hours or while taking prescription Narcotics:  Limit your activities  Do not drive and operate hazardous machinery  Do not make important personal or business decisions  Do  not drink alcoholic beverages  If you have not urinated within 8 hours after discharge, please contact your surgeon on call. Report the following to your surgeon:  Excessive pain, swelling, redness or odor of or around the surgical area  Temperature over 100.5  Nausea and vomiting lasting longer than 4 hours or if unable to take medications  Any signs of decreased circulation or nerve impairment to extremity: change in color, persistent  numbness, tingling, coldness or increase pain  Any questions    What to do at Home:  Recommended activity: activity as tolerated and no driving until advised by doctor. *  Please give a list of your current medications to your Primary Care Provider. *  Please update this list whenever your medications are discontinued, doses are      changed, or new medications (including over-the-counter products) are added. *  Please carry medication information at all times in case of emergency situations. These are general instructions for a healthy lifestyle:    No smoking/ No tobacco products/ Avoid exposure to second hand smoke  Surgeon General's Warning:  Quitting smoking now greatly reduces serious risk to your health.     Obesity, smoking, and sedentary lifestyle greatly increases your risk for illness    A healthy diet, regular physical exercise & weight monitoring are important for maintaining a healthy lifestyle    You may be retaining fluid if you have a history of heart failure or if you experience any of the following symptoms:  Weight gain of 3 pounds or more overnight or 5 pounds in a week, increased swelling in our hands or feet or shortness of breath while lying flat in bed. Please call your doctor as soon as you notice any of these symptoms; do not wait until your next office visit. Patient armband removed and shredded. The discharge information has been reviewed with the patient and caregiver. The patient and caregiver verbalized understanding. Discharge medications reviewed with the patient and caregiver and appropriate educational materials and side effects teaching were provided.   ___________________________________________________________________________________________________________________________________

## 2023-02-22 NOTE — H&P
Assessment/Plan  #  Detail Type  Description   1. Assessment  Ventral hernia (K43.9). Patient Plan  He would like this repaired now. Recommend robotic repair. Discussed Dx and options. I have discussed the risks benefits and alternatives of the procedure to the patient including bleeding, infection, use of mesh, myofascial release, chronic post op pain, reason and side effects of nerve resections, recurrence . They understand and wish to proceed. This 60year old male presents for Hernia. History of Present Illness  1. Hernia   , periumbilical  The patient describes it as dull. Context includes physical activity. Identified risk factors include history of hernias. Additional information: Marycruz umbilical hernia larger with more discomfort now. Previous open RIH. Problem List  Problem Description  Onset Date  Chronic  Clinical Status  Notes  Diverticulosis of colon    Y          Medications (active prior to today)  Medication  Instructions  Start Date  Stop Date  Refilled  Elsewhere  Vitamin D3 125 mcg (5,000 unit) tablet  take 1 by Oral route twice a week  //      Y  cod liver oil capsule    //      Y  Florastor 250 mg capsule    02/07/2022      N  Fiber (calcium polycarbophil) 625 mg tablet    02/07/2022      N  selenium 200 mcg capsule  take 1 capsule daily  02/07/2022      N  vitamin B complex capsule  take 1 by Oral route  every day  02/07/2022      N  pantoprazole 20 mg tablet,delayed release  take 1 tablet by oral route  every day  02/07/2022      N  Golytely 236 gram-22.74 gram-6.74 gram-5.86 gram oral solution  Drink one eight ounce glass every 10-15 minutes until gone. 12/22/2022      N        Allergies  Ingredient  Reaction (Severity)  Medication Name  Comment  NO KNOWN ALLERGIES            Review of Systems  System  Neg/Pos  Details  Constitutional  Negative  Chills, Fatigue, Fever, Malaise, Night sweats, Weight gain and Weight loss.   ENMT  Negative  Ear drainage, Hearing loss, Nasal drainage, Otalgia, Sinus pressure and Sore throat. Eyes  Negative  Eye discharge, Eye pain and Vision changes. Respiratory  Negative  Chronic cough, Cough, Dyspnea, Known TB exposure and Wheezing. Cardio  Negative  Chest pain, Claudication, Edema and Irregular heartbeat/palpitations. GI  Negative  Abdominal pain, Blood in stool, Change in stool pattern, Constipation, Decreased appetite, Diarrhea, Heartburn, Nausea and Vomiting. Physical Exam  Exam  Findings  Details  Constitutional  Normal  Well developed. Eyes  Normal  Conjunctiva - Right: Normal, Left: Normal. Pupil - Right: Normal, Left: Normal.  Neck Exam  Normal  Inspection - Normal.  Respiratory  Normal  Inspection - Normal. Auscultation - Normal. Effort - Normal.  Cardiovascular  Normal  Regular rate and rhythm. No murmurs, gallops, or rubs. Vascular  Normal  Capillary refill - Less than 2 seconds. Abdomen  *  Hernia - Positive. Type: ventral. Location: periumbilical. Reducible . Abdomen  Normal  Inspection - Normal. No abdominal tenderness. No hepatic enlargement. No spleen enlargement. Genitourinary  Normal  No hernia. Musculoskeletal  Normal  Visual overview of all four extremities is normal.  Neurological  Normal  Memory - Normal. Cranial nerves - Cranial nerves II through XII grossly intact. Psychiatric  Normal  Orientation - Oriented to time, place, person & situation. Appropriate mood and affect.     Immunizations Entered by History  Date  Immunization  10/29/2022 12:00:00 AM  FLUARIX QUAD [id number] SYRINGE  4/12/2021 12:00:00 AM  SARS-COV-2 (COVID-19) vaccine, mRNA, spike protein, LNP, preservative free, 100 mcg/0.5mL dose  3/15/2021 12:00:00 AM  SARS-COV-2 (COVID-19) vaccine, mRNA, spike protein, LNP, preservative free, 100 mcg/0.5mL dose  12/20/2020 12:00:00 AM  Shingrix  10/12/2020 12:00:00 AM  Shingrix  4/2/2013 12:00:00 AM  tetanus toxoid, reduced diphtheria toxoid, and acellular pertussis vaccine, adsorbed  10/16/2014 12:00:00 AM  Flu (3 yrs or older)  11/11/2013 12:00:00 AM  Flu (3 yrs or older)  10/26/2012 12:00:00 AM  Flu (3 yrs or older)  9/21/2011 12:00:00 AM  Flu (3 yrs or older)  10/14/2010 12:00:00 AM  Flu (3 yrs or older)  11/13/2003 12:00:00 AM  Flu (3 yrs or older)  10/14/2002 12:00:00 AM  Flu (3 yrs or older)        Medications (added, continued, or stopped this visit)  Start Date  Medication  Directions  PRN Status  PRN Reason  Instruction  Stop Date    cod liver oil capsule    N        02/07/2022  Fiber (calcium polycarbophil) 625 mg tablet    N        02/07/2022  Florastor 250 mg capsule    N        12/22/2022  Golytely 236 gram-22.74 gram-6.74 gram-5.86 gram oral solution  Drink one eight ounce glass every 10-15 minutes until gone.   N        02/07/2022  pantoprazole 20 mg tablet,delayed release  take 1 tablet by oral route  every day  N        02/07/2022  selenium 200 mcg capsule  take 1 capsule daily  N        02/07/2022  vitamin B complex capsule  take 1 by Oral route  every day  N          Vitamin D3 125 mcg (5,000 unit) tablet  take 1 by Oral route twice a week  N          Active Patient Care Team Members  Name  Contact  Agency Type  Support Role  Relationship  Active Date  Inactive Date  [address]      Patient provider  PCP      Dewayne Arita      Emergency Contact  Unknown

## 2023-02-22 NOTE — PERIOP NOTE
TRANSFER - OUT REPORT:    Verbal report given to [address] on [place]  being transferred to [facility] for routine progression of patient care       Report consisted of patient's Situation, Background, Assessment and   Recommendations(SBAR). Information from the following report(s) Adult Overview, Surgery Report, Intake/Output, and MAR was reviewed with the receiving nurse. [place] Assessment: No data recorded  Lines:   Peripheral IV 02/22/23 Left Hand (Active)   Site Assessment Clean, dry & intact 02/22/23 1540   Phlebitis Assessment No symptoms 02/22/23 1540   Infiltration Assessment 0 02/22/23 1540   Dressing Status Clean, dry & intact 02/22/23 1540   Dressing Type Transparent 02/22/23 1540        Opportunity for questions and clarification was provided.       Patient transported with:  Registered Nurse

## 2023-02-22 NOTE — PERIOP NOTE
TRANSFER - IN REPORT:    Verbal report received from Divina Rowley RN(name) on Lupe Del Cid  being received from PACU (unit) for routine progression of patient care      Report consisted of patients Situation, Background, Assessment and   Recommendations(SBAR). Information from the following report(s) Nurse Handoff Report, Surgery Report, Intake/Output, and MAR was reviewed with the receiving nurse. Opportunity for questions and clarification was provided.       Assessment completed upon patients arrival to unit and care assume

## 2023-02-22 NOTE — PERIOP NOTE
Reviewed PTA medication list with patient/caregiver and patient/caregiver denies any additional medications. Patient admits to having a responsible adult care for them at home for at least 24 hours after surgery. Patient encouraged to use gown warming system and informed that using said warming gown to regulate body temperature prior to a procedure has been shown to help reduce the risks of blood clots and infection. Patient's pharmacy of choice verified and documented in PTA medication section. Dual skin assessment & fall risk band verification completed with Albertina Aguirre.

## 2023-02-23 NOTE — OP NOTE
Aashish Oviedo  [id number]  1960  [id number]  2/22/2023  [unfilled]     PREOPERATIVE DIAGNOSIS: VENTRAL HERNIA >4CM     POST-OPERATOVE DIAGNOSIS: Same    PROCEDURES PERFORMED: Procedure(s):  ROBOTIC VENTRAL HERNIA /Myofascial release    SURGEON: Cookie Suarez MD    ANESTHESIA: General     Assistant:  Surgical Assistant: Eduar Mcmanus    Implants:  Implant Name Type Inv. Item Serial No.  Lot No. LRB No. Used Action   MESH BRIAN RND 12 CM COMP PARIETENE DS - [id number]  MESH BRIAN RND 12 CM COMP PARIETENE DS  [employer]  SURGICAL-WD [id number] 1 Implanted       ESTIMATED BLOOD LOSS: min    SPECIMENS: * No specimens in log *        INDICATIONS: This is a  who presents with ventral hernia. I have discussed the risks, benefits and alternatives. I have discussed the procedure, use of mesh, recurrence, bowel, organ injury, risk of anesthesia, chronic pain, myofascial release. The understand and want to proceed. DESCRIPTION OF PROCEDURE: The patient was placed in the supine position. The abdomen was prepped and draped in the usual fashion. A Veress needle was inserted in the left upper quadrant at [place] point using one-drop technique and pneumoperitoneum was established. An 8 mm robotic port was placed on the left side of the abdominal wall under visualization. An 8 mm AirSeal port was placed in the epigastrium as a working port. Two 8 mm robotic ports were placed, one in the left upper quadrant laterally and one in the left lower quadrant laterally. The robot was docked in a routine fashion. The abdomen was inspected and there were no other abnormalities noted. The patient was found to have a ventral hernia, 4 cm. The omentum was reduced.   The preperitoneal fat was dissected away from the hernia defect and once this was completed to allow adequate primary closure with as little tension as possible, a modified posterior myofascial release was done on the right side of the defect using the monopolar scissors in the area of hernia defect. Once this was completed, utilizing the myofascial release primary the repair was completed using 0 PDS V-Loc continuous suture. Next,  [employer] 12cm PPDS mesh was placed over the defect and sutured to the abdominal wall using 2-0 PDS V-Loc continuous sutures. There was good overlay and position of the mesh. All ports were removed. There was good hemostasis. The skin incisions were closed with 4-0 Monocryl in subcuticular closure and Dermabond.              Alexis Vela MD  2/23/2023  11:09 AM

## (undated) DEVICE — SPONGE GZ W4XL4IN RAYON POLY 4 PLY NONWOVEN FASTER WICKING

## (undated) DEVICE — SOL INJ NACL 0.9% 500ML --

## (undated) DEVICE — NDL PRT INJ NSAF BLNT 18GX1.5 --

## (undated) DEVICE — SOLUTION IRRIG 500ML 0.9% SOD CHLO USP POUR PLAS BTL

## (undated) DEVICE — KENDALL RADIOLUCENT FOAM MONITORING ELECTRODE RECTANGULAR SHAPE: Brand: KENDALL

## (undated) DEVICE — SYRINGE MED 5ML STD CLR PLAS LUERLOCK TIP N CTRL DISP

## (undated) DEVICE — SUTURE MCRYL SZ 4-0 L18IN ABSRB UD L19MM PS-2 3/8 CIR PRIM Y496G

## (undated) DEVICE — GLOVE SURG SZ 8 L12IN FNGR THK79MIL GRN LTX FREE

## (undated) DEVICE — COVER MPLR TIP CRV SCIS ACC DA VINCI

## (undated) DEVICE — COLUMN DRAPE

## (undated) DEVICE — LAP CHOLE: Brand: MEDLINE INDUSTRIES, INC.

## (undated) DEVICE — CANNULA SEAL

## (undated) DEVICE — SUTURE ABSORBABLE MONOFILAMENT 0 GS22 9 IN GRN V-LOC 180 VLOCL2246

## (undated) DEVICE — SUTURE DEV SZ 2-0 WND CLSR ABSRB GS-22 VLOC COVIDIEN VLOCM2145

## (undated) DEVICE — TRAP SPEC POLYP REM STRNR CLN DSGN MAGNIFYING WIND DISP

## (undated) DEVICE — TRNQT ROLLED 1X18IN BLU LF --

## (undated) DEVICE — 3M™ BAIR PAWS FLEX™ WARMING GOWN, SMALL, 20 PER CASE 81103: Brand: BAIR PAWS™

## (undated) DEVICE — DEVICE CLSR TRCR PRT

## (undated) DEVICE — CANNULA CUSH AD W/ 14FT TBG

## (undated) DEVICE — SYRINGE 50ML E/T

## (undated) DEVICE — Z INACTIVE USE 2854097 SPONGE GZ W4XL4IN COT 12 PLY TYP VII WVN C FLD DSGN

## (undated) DEVICE — NEEDLE FLTR 18GA L1.5IN MEM THK5UM BLNT DISP

## (undated) DEVICE — TRI-LUMEN FILTERED TUBE SET WITH ACTIVATED CHARCOAL FILTER: Brand: AIRSEAL

## (undated) DEVICE — SUTURE PDS II SZ 2-0 L27IN ABSRB VLT L26MM CT-2 1/2 CIR Z333H

## (undated) DEVICE — SYR 3ML LL TIP 1/10ML GRAD --

## (undated) DEVICE — ARM DRAPE

## (undated) DEVICE — CATHETER IV 22GA L1IN BLU POLYUR STR HUB RADPQ PROTCT +

## (undated) DEVICE — Device

## (undated) DEVICE — DRAPE C ARM UNIV W41XL74IN CLR PLAS XR VELC CLSR POLY STRP

## (undated) DEVICE — SET ADMIN 16ML TBNG L100IN 2 Y INJ SITE IV PIGGY BK DISP (ORDER IN MULIPLES OF 48)

## (undated) DEVICE — TUBING, SUCTION, 1/4" X 12', STRAIGHT: Brand: MEDLINE

## (undated) DEVICE — SINGLE PORT MANIFOLD: Brand: NEPTUNE 2

## (undated) DEVICE — GLOVE ORANGE PI 7 1/2   MSG9075

## (undated) DEVICE — SOLUTION ANTIFOG VIS SYS CLEARIFY LAPSCP

## (undated) DEVICE — BLADELESS OBTURATOR: Brand: WECK VISTA

## (undated) DEVICE — MAJ-1414 SINGLE USE ADPATER BIOPSY VALV: Brand: SINGLE USE ADAPTOR BIOPSY VALVE

## (undated) DEVICE — GARMENT,MEDLINE,DVT,INT,CALF,MED, GEN2: Brand: MEDLINE

## (undated) DEVICE — CATH SUC CTRL PRT TRIFLO 14FR --

## (undated) DEVICE — WRISTBAND ID AD W2.5XL9.5CM RED VYN ADH CLSR UNI-PRINT

## (undated) DEVICE — ADHESIVE SKIN CLSR 0.7ML TOP DERMBND ADV

## (undated) DEVICE — INSUFFLATION NEEDLE TO ESTABLISH PNEUMOPERITONEUM.: Brand: INSUFFLATION NEEDLE

## (undated) DEVICE — AIRSEAL 8 MM ACCESS PORT AND LOW PROFILE OBTURATOR WITH BLADELESS OPTICAL TIP, 120 MM LENGTH: Brand: AIRSEAL